# Patient Record
Sex: MALE | Race: BLACK OR AFRICAN AMERICAN | Employment: UNEMPLOYED | URBAN - METROPOLITAN AREA
[De-identification: names, ages, dates, MRNs, and addresses within clinical notes are randomized per-mention and may not be internally consistent; named-entity substitution may affect disease eponyms.]

---

## 2018-04-26 ENCOUNTER — OFFICE VISIT (OUTPATIENT)
Dept: NEUROLOGY | Age: 48
End: 2018-04-26

## 2018-04-26 VITALS
OXYGEN SATURATION: 97 % | HEIGHT: 70 IN | DIASTOLIC BLOOD PRESSURE: 80 MMHG | HEART RATE: 64 BPM | BODY MASS INDEX: 36.36 KG/M2 | SYSTOLIC BLOOD PRESSURE: 130 MMHG | RESPIRATION RATE: 14 BRPM | WEIGHT: 254 LBS

## 2018-04-26 DIAGNOSIS — G44.329 CHRONIC POST-TRAUMATIC HEADACHE, NOT INTRACTABLE: Primary | ICD-10-CM

## 2018-04-26 RX ORDER — CAPSAICIN 0.03 G/100G
CREAM TOPICAL 3 TIMES DAILY
COMMUNITY

## 2018-04-26 RX ORDER — DOCUSATE SODIUM 100 MG/1
100 CAPSULE, LIQUID FILLED ORAL 2 TIMES DAILY
COMMUNITY

## 2018-04-26 RX ORDER — TRAZODONE HYDROCHLORIDE 100 MG/1
100 TABLET ORAL
COMMUNITY
End: 2018-07-26 | Stop reason: ALTCHOICE

## 2018-04-26 RX ORDER — METFORMIN HYDROCHLORIDE 500 MG/1
TABLET ORAL 2 TIMES DAILY WITH MEALS
COMMUNITY

## 2018-04-26 RX ORDER — ACETAMINOPHEN 500 MG
TABLET ORAL
COMMUNITY

## 2018-04-26 RX ORDER — FACIAL-BODY WIPES
10 EACH TOPICAL DAILY
COMMUNITY

## 2018-04-26 RX ORDER — DICLOFENAC SODIUM 10 MG/G
GEL TOPICAL 4 TIMES DAILY
COMMUNITY

## 2018-04-26 RX ORDER — GABAPENTIN 300 MG/1
300 CAPSULE ORAL 2 TIMES DAILY
Qty: 60 CAP | Refills: 2 | Status: SHIPPED | OUTPATIENT
Start: 2018-04-26 | End: 2018-07-26 | Stop reason: ALTCHOICE

## 2018-04-26 RX ORDER — TOPIRAMATE 100 MG/1
100 TABLET, FILM COATED ORAL 2 TIMES DAILY
Qty: 60 TAB | Refills: 2 | Status: SHIPPED | OUTPATIENT
Start: 2018-04-26

## 2018-04-26 NOTE — PROGRESS NOTES
Patient is here for headaches  20+ years  Usually 6-8/10 pain daily headaches  TBI in [employer]
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
## 2018-04-26 NOTE — MR AVS SNAPSHOT
[facility] [address] 
[phone] Patient: Nelly Wagoner MRN: [id number] OLP:[birth year] Visit Information Date & Time Provider Department Dept. Phone Encounter #  
 4/26/2018  2:00 PM Vincent Sinclair MD [facility] at [facility] [id number] [phone] Follow-up Instructions Return in about 3 months (around 7/26/2018). Upcoming Health Maintenance Date Due Pneumococcal 19-64 Medium Risk (1 of 1 - PPSV23) 9/1/1988 DTaP/Tdap/Td series (1 - Tdap) 9/1/1990 Influenza Age 5 to Adult 8/1/2017 Allergies as of 4/26/2018  Review Complete On: 4/26/2018 By: Vincent Sinclair MD  
 No Known Allergies Current Immunizations  Never Reviewed No immunizations on file. Not reviewed this visit You Were Diagnosed With   
  
 Codes Comments Chronic post-traumatic headache, not intractable    -  Primary ICD-10-CM: D03.274 ICD-9-CM: 339.22 Vitals BP Pulse Resp Height(growth percentile) Weight(growth percentile) SpO2  
 130/80 64 14 5' 10\" (1.778 m) 254 lb (115.2 kg) 97% BMI Smoking Status 36.45 kg/m2 Current Every Day Smoker Vitals History BMI and BSA Data Body Mass Index Body Surface Area  
 36.45 kg/m 2 2.39 m 2 Your Updated Medication List  
  
   
This list is accurate as of 4/26/18  2:35 PM.  Always use your most recent med list.  
  
  
  
  
 atorvastatin 80 mg tablet Commonly known as:  LIPITOR Take 80 mg by mouth daily. bisacodyl 10 mg suppository Commonly known as:  DULCOLAX Insert 10 mg into rectum daily. capsicum oleoresin 0.025 % topical cream  
Apply  to affected area three (3) times daily. cholecalciferol 1,000 unit tablet Commonly known as:  VITAMIN D3 Take 2,000 Units by mouth daily. diclofenac 1 % Gel Commonly known as:  VOLTAREN  
 Apply  to affected area four (4) times daily. docusate sodium 100 mg capsule Commonly known as:  Brigido Stai Take 100 mg by mouth two (2) times a day.  
  
 gabapentin 300 mg capsule Commonly known as:  NEURONTIN Take 1 Cap by mouth two (2) times a day.  
  
 hydrOXYzine HCl 50 mg tablet Commonly known as:  ATARAX Take 50 mg by mouth nightly. metFORMIN 500 mg tablet Commonly known as:  GLUCOPHAGE Take  by mouth two (2) times daily (with meals). Omeprazole delayed release 20 mg tablet Commonly known as:  PRILOSEC D/R Take 40 mg by mouth two (2) times a day. sertraline 100 mg tablet Commonly known as:  ZOLOFT Take 150 mg by mouth daily. topiramate 100 mg tablet Commonly known as:  TOPAMAX Take 1 Tab by mouth two (2) times a day. traZODone 100 mg tablet Commonly known as:  Crockett Dart Take 100 mg by mouth nightly. TYLENOL EXTRA STRENGTH 500 mg tablet Generic drug:  acetaminophen Take  by mouth every six (6) hours as needed for Pain. Prescriptions Printed Refills  
 topiramate (TOPAMAX) 100 mg tablet 2 Sig: Take 1 Tab by mouth two (2) times a day. Class: Print Route: Oral  
 gabapentin (NEURONTIN) 300 mg capsule 2 Sig: Take 1 Cap by mouth two (2) times a day. Class: Print Route: Oral  
  
Follow-up Instructions Return in about 3 months (around 7/26/2018). Introducing [facility]! [facility] introduces Sampa patient portal. Now you can access parts of your medical record, email your doctor's office, and request medication refills online. 1. In your internet browser, go to [email or web address] 2. Click on the First Time User? Click Here link in the Sign In box. You will see the New Member Sign Up page. 3. Enter your Sampa Access Code exactly as it appears below. You will not need to use this code after youve completed the sign-up process.  If you do not sign up before the expiration date, you must request a new code. · [employer] Access Code: [email or web address] Expires: 7/25/2018  2:28 PM 
 
4. Enter the last four digits of your Social Security Number (xxxx) and Date of Birth (mm/dd/yyyy) as indicated and click Submit. You will be taken to the next sign-up page. 5. Create a [employer] ID. This will be your [employer] login ID and cannot be changed, so think of one that is secure and easy to remember. 6. Create a [employer] password. You can change your password at any time. 7. Enter your Password Reset Question and Answer. This can be used at a later time if you forget your password. 8. Enter your e-mail address. You will receive e-mail notification when new information is available in [address] 9. Click Sign Up. You can now view and download portions of your medical record. 10. Click the Download Summary menu link to download a portable copy of your medical information. If you have questions, please visit the Frequently Asked Questions section of the [employer] website. Remember, [employer] is NOT to be used for urgent needs. For medical emergencies, dial 911. Now available from your iPhone and Android! Please provide this summary of care documentation to your next provider. Your primary care clinician is listed as Varun Painter. If you have any questions after today's visit, please call [phone].

## 2018-05-14 ENCOUNTER — TELEPHONE (OUTPATIENT)
Dept: NEUROLOGY | Age: 48
End: 2018-05-14

## 2018-07-26 ENCOUNTER — OFFICE VISIT (OUTPATIENT)
Dept: NEUROLOGY | Age: 48
End: 2018-07-26

## 2018-07-26 VITALS
OXYGEN SATURATION: 97 % | WEIGHT: 261 LBS | HEIGHT: 70 IN | BODY MASS INDEX: 37.37 KG/M2 | SYSTOLIC BLOOD PRESSURE: 130 MMHG | HEART RATE: 77 BPM | DIASTOLIC BLOOD PRESSURE: 80 MMHG | RESPIRATION RATE: 16 BRPM

## 2018-07-26 DIAGNOSIS — F32.A MILD DEPRESSION: ICD-10-CM

## 2018-07-26 DIAGNOSIS — G44.329 CHRONIC POST-TRAUMATIC HEADACHE, NOT INTRACTABLE: Primary | ICD-10-CM

## 2018-07-26 DIAGNOSIS — G43.709 CHRONIC MIGRAINE WITHOUT AURA WITHOUT STATUS MIGRAINOSUS, NOT INTRACTABLE: ICD-10-CM

## 2018-07-26 RX ORDER — DULOXETIN HYDROCHLORIDE 30 MG/1
30 CAPSULE, DELAYED RELEASE ORAL DAILY
Qty: 30 CAP | Refills: 1 | Status: SHIPPED | OUTPATIENT
Start: 2018-07-26 | End: 2018-07-26 | Stop reason: SDUPTHER

## 2018-07-26 RX ORDER — DULOXETIN HYDROCHLORIDE 30 MG/1
30 CAPSULE, DELAYED RELEASE ORAL DAILY
Qty: 30 CAP | Refills: 1 | Status: SHIPPED | OUTPATIENT
Start: 2018-07-26 | End: 2018-08-09 | Stop reason: ALTCHOICE

## 2018-07-26 NOTE — MR AVS SNAPSHOT
[facility] [address] 
[phone] Patient: Kathy Clements MRN: [id number] MCB:[birth year] Visit Information Date & Time Provider Department Dept. Phone Encounter #  
 7/26/2018  1:40 PM Abeba Fu MD [facility] Neurology Clinic at [address] [phone] Follow-up Instructions Return in about 3 months (around 10/26/2018). Upcoming Health Maintenance Date Due Pneumococcal 19-64 Medium Risk (1 of 1 - PPSV23) 9/1/1988 DTaP/Tdap/Td series (1 - Tdap) 9/1/1990 Influenza Age 5 to Adult 8/1/2018 Allergies as of 7/26/2018  Review Complete On: 7/26/2018 By: Abeba Fu MD  
 No Known Allergies Current Immunizations  Never Reviewed No immunizations on file. Not reviewed this visit You Were Diagnosed With   
  
 Codes Comments Chronic post-traumatic headache, not intractable    -  Primary ICD-10-CM: X72.696 ICD-9-CM: 339.22 Chronic migraine without aura without status migrainosus, not intractable     ICD-10-CM: A75.061 ICD-9-CM: 346.70 Mild depression (HCC)     ICD-10-CM: F32.0 ICD-9-CM: 262 Vitals BP Pulse Resp Height(growth percentile) Weight(growth percentile) SpO2  
 130/80 77 16 5' 10\" (1.778 m) 261 lb (118.4 kg) 97% BMI Smoking Status 37.45 kg/m2 Current Every Day Smoker Vitals History BMI and BSA Data Body Mass Index Body Surface Area  
 37.45 kg/m 2 2.42 m 2 Preferred Pharmacy Pharmacy Name Phone [address] [facility] [phone] Your Updated Medication List  
  
   
This list is accurate as of 7/26/18  2:04 PM.  Always use your most recent med list.  
  
  
  
  
 atorvastatin 80 mg tablet Commonly known as:  LIPITOR Take 80 mg by mouth daily. bisacodyl 10 mg suppository Commonly known as:  DULCOLAX Insert 10 mg into rectum daily. capsicum oleoresin 0.025 % topical cream  
Apply  to affected area three (3) times daily. cholecalciferol 1,000 unit tablet Commonly known as:  VITAMIN D3 Take 2,000 Units by mouth daily. diclofenac 1 % Gel Commonly known as:  VOLTAREN Apply  to affected area four (4) times daily. docusate sodium 100 mg capsule Commonly known as:  Paxico Lass Take 100 mg by mouth two (2) times a day. DULoxetine 30 mg capsule Commonly known as:  CYMBALTA Take 1 Cap by mouth daily. Indications: ANXIETY WITH DEPRESSION  
  
 erenumab-aooe 70 mg/mL Atin Commonly known as:  AIMOVIG AUTOINJECTOR (2 PACK) 70 mg by SubCUTAneous route every thirty (30) days. hydrOXYzine HCl 50 mg tablet Commonly known as:  ATARAX Take 50 mg by mouth nightly. metFORMIN 500 mg tablet Commonly known as:  GLUCOPHAGE Take  by mouth two (2) times daily (with meals). Omeprazole delayed release 20 mg tablet Commonly known as:  PRILOSEC D/R Take 40 mg by mouth two (2) times a day. sertraline 100 mg tablet Commonly known as:  ZOLOFT Take 150 mg by mouth daily. topiramate 100 mg tablet Commonly known as:  TOPAMAX Take 1 Tab by mouth two (2) times a day. TYLENOL EXTRA STRENGTH 500 mg tablet Generic drug:  acetaminophen Take  by mouth every six (6) hours as needed for Pain. Prescriptions Printed Refills  
 erenumab-aooe (AIMOVIG AUTOINJECTOR, 2 PACK,) 70 mg/mL atIn 2 Si mg by SubCUTAneous route every thirty (30) days. Class: Print Route: SubCUTAneous DULoxetine (CYMBALTA) 30 mg capsule 1 Sig: Take 1 Cap by mouth daily. Indications: ANXIETY WITH DEPRESSION Class: Print Route: Oral  
  
Follow-up Instructions Return in about 3 months (around 10/26/2018). Patient Instructions A Healthy Lifestyle: Care Instructions Your Care Instructions A healthy lifestyle can help you feel good, stay at a healthy weight, and have plenty of energy for both work and play. A healthy lifestyle is something you can share with your whole family. A healthy lifestyle also can lower your risk for serious health problems, such as high blood pressure, heart disease, and diabetes. You can follow a few steps listed below to improve your health and the health of your family. Follow-up care is a key part of your treatment and safety. Be sure to make and go to all appointments, and call your doctor if you are having problems. It's also a good idea to know your test results and keep a list of the medicines you take. How can you care for yourself at home? · Do not eat too much sugar, fat, or fast foods. You can still have dessert and treats now and then. The goal is moderation. · Start small to improve your eating habits. Pay attention to portion sizes, drink less juice and soda pop, and eat more fruits and vegetables. ¨ Eat a healthy amount of food. A 3-ounce serving of meat, for example, is about the size of a deck of cards. Fill the rest of your plate with vegetables and whole grains. ¨ Limit the amount of soda and sports drinks you have every day. Drink more water when you are thirsty. ¨ Eat at least 5 servings of fruits and vegetables every day. It may seem like a lot, but it is not hard to reach this goal. A serving or helping is 1 piece of fruit, 1 cup of vegetables, or 2 cups of leafy, raw vegetables. Have an apple or some carrot sticks as an afternoon snack instead of a candy bar. Try to have fruits and/or vegetables at every meal. 
· Make exercise part of your daily routine. You may want to start with simple activities, such as walking, bicycling, or slow swimming. Try to be active 30 to 60 minutes every day. You do not need to do all 30 to 60 minutes all at once.  For example, you can exercise 3 times a day for 10 or 20 minutes. Moderate exercise is safe for most people, but it is always a good idea to talk to your doctor before starting an exercise program. 
· Keep moving. Ted Ramez the lawn, work in the garden, or [email or web address]. Take the stairs instead of the elevator at work. · If you smoke, quit. People who smoke have an increased risk for heart attack, stroke, cancer, and other lung illnesses. Quitting is hard, but there are ways to boost your chance of quitting tobacco for good. ¨ Use nicotine gum, patches, or lozenges. ¨ Ask your doctor about stop-smoking programs and medicines. ¨ Keep trying. In addition to reducing your risk of diseases in the future, you will notice some benefits soon after you stop using tobacco. If you have shortness of breath or asthma symptoms, they will likely get better within a few weeks after you quit. · Limit how much alcohol you drink. Moderate amounts of alcohol (up to 2 drinks a day for men, 1 drink a day for women) are okay. But drinking too much can lead to liver problems, high blood pressure, and other health problems. Family health If you have a family, there are many things you can do together to improve your health. · Eat meals together as a family as often as possible. · Eat healthy foods. This includes fruits, vegetables, lean meats and dairy, and whole grains. · Include your family in your fitness plan. Most people think of activities such as jogging or tennis as the way to fitness, but there are many ways you and your family can be more active. Anything that makes you breathe hard and gets your heart pumping is exercise. Here are some tips: 
¨ Walk to do errands or to take your child to school or the bus. ¨ Go for a family bike ride after dinner instead of watching TV. Where can you learn more? Go to http://bhumi-gonzalo.info/. Enter W180 in the search box to learn more about \"A Healthy Lifestyle: Care Instructions. \" Current as of: December 7, 2017 Content Version: 11.7 © 9513-4809 [employer]. Care instructions adapted under license by [employer] (which disclaims liability or warranty for this information). If you have questions about a medical condition or this instruction, always ask your healthcare professional. [address] any warranty or liability for your use of this information. Introducing [facility]! [employer] introduces Elo7 patient portal. Now you can access parts of your medical record, email your doctor's office, and request medication refills online. 1. In your internet browser, go to [email or web address] 2. Click on the First Time User? Click Here link in the Sign In box. You will see the New Member Sign Up page. 3. Enter your Elo7 Access Code exactly as it appears below. You will not need to use this code after youve completed the sign-up process. If you do not sign up before the expiration date, you must request a new code. · Elo7 Access Code: [email or web address] Expires: 10/24/2018  1:35 PM 
 
4. Enter the last four digits of your Social Security Number (xxxx) and Date of Birth (mm/dd/yyyy) as indicated and click Submit. You will be taken to the next sign-up page. 5. Create a Elo7 ID. This will be your Elo7 login ID and cannot be changed, so think of one that is secure and easy to remember. 6. Create a Elo7 password. You can change your password at any time. 7. Enter your Password Reset Question and Answer. This can be used at a later time if you forget your password. 8. Enter your e-mail address. You will receive e-mail notification when new information is available in [address] 9. Click Sign Up. You can now view and download portions of your medical record. 10. Click the Download Summary menu link to download a portable copy of your medical information. If you have questions, please visit the Frequently Asked Questions section of the [employer] | [employer] website. Remember, [employer] is NOT to be used for urgent needs. For medical emergencies, dial 911. Now available from your iPhone and Android! Please provide this summary of care documentation to your next provider. Your primary care clinician is listed as Judd Upton. If you have any questions after today's visit, please call [phone].
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
## 2018-07-26 NOTE — PATIENT INSTRUCTIONS

## 2018-08-08 ENCOUNTER — TELEPHONE (OUTPATIENT)
Dept: NEUROLOGY | Age: 48
End: 2018-08-08

## 2018-08-08 NOTE — TELEPHONE ENCOUNTER
Pt called to let you know the \"shot\" he got at his last visit didn't help at all for his headache. Also, he wants you to know the duloxetine and Sertraline make him so very sleepy. Is there something else he can take?

## 2018-08-08 NOTE — TELEPHONE ENCOUNTER
Pt stated he was supposed to call back and speak with the nurse re medication. Please call back after 12.

## 2018-08-09 RX ORDER — PROTRIPTYLINE HYDROCHLORIDE 5 MG/1
5 TABLET, FILM COATED ORAL
Qty: 30 TAB | Refills: 1 | Status: SHIPPED | OUTPATIENT
Start: 2018-08-09

## 2018-08-09 NOTE — TELEPHONE ENCOUNTER
I believe he is referring to Aimovig monthly injection for prophylaxis. Ramos Settles should try at least 1 more injection next month. He may stop Cymbalta and try protriptyline 5 mg instead which is less sedating or can be alerting. Take 5 mg for 1week and increase to 10 mg if needed. (Routing comment)     Do you want him to stay on the Sertraline?

## 2018-08-10 NOTE — TELEPHONE ENCOUNTER
Yes (Routing comment)     I called pt and left detailed msg on his cell#. Also asked that he call us back so I know where to send new script. Looks like he uses [facility] and I don't see a fax # for them. Does he wasn't to p/u or have mailed to him?

## 2018-08-13 ENCOUNTER — TELEPHONE (OUTPATIENT)
Dept: NEUROLOGY | Age: 48
End: 2018-08-13

## 2018-08-13 NOTE — TELEPHONE ENCOUNTER
----- Message from Ivania Alicea sent at 8/13/2018 12:32 PM EDT -----  Regarding: Dr. Collins Levin  Pt advised Cele Kilgore that document regarding Rx (not disclosed) should be sent to [facility] attn: Dr. Jaren Gonzalez f) [phone] (please include medical notes for why medication is needed). Pt best contact [phone].

## 2018-10-30 ENCOUNTER — OFFICE VISIT (OUTPATIENT)
Dept: NEUROLOGY | Age: 48
End: 2018-10-30

## 2018-10-30 VITALS
HEIGHT: 70 IN | SYSTOLIC BLOOD PRESSURE: 140 MMHG | OXYGEN SATURATION: 97 % | RESPIRATION RATE: 16 BRPM | HEART RATE: 72 BPM | DIASTOLIC BLOOD PRESSURE: 80 MMHG | WEIGHT: 262 LBS | BODY MASS INDEX: 37.51 KG/M2

## 2018-10-30 DIAGNOSIS — G43.709 CHRONIC MIGRAINE WITHOUT AURA WITHOUT STATUS MIGRAINOSUS, NOT INTRACTABLE: ICD-10-CM

## 2018-10-30 DIAGNOSIS — G44.329 CHRONIC POST-TRAUMATIC HEADACHE, NOT INTRACTABLE: Primary | ICD-10-CM

## 2018-10-30 RX ORDER — TRAZODONE HYDROCHLORIDE 100 MG/1
100 TABLET ORAL
COMMUNITY

## 2018-10-30 RX ORDER — TROSPIUM CHLORIDE 20 MG/1
20 TABLET, FILM COATED ORAL
COMMUNITY

## 2018-10-30 RX ORDER — TAMSULOSIN HYDROCHLORIDE 0.4 MG/1
0.4 CAPSULE ORAL DAILY
COMMUNITY

## 2018-10-30 RX ORDER — PRAZOSIN HYDROCHLORIDE 2 MG/1
2 CAPSULE ORAL
COMMUNITY

## 2018-10-30 RX ORDER — BISMUTH SUBSALICYLATE 262 MG
1 TABLET,CHEWABLE ORAL DAILY
COMMUNITY
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
## 2024-03-16 ENCOUNTER — HOSPITAL ENCOUNTER (EMERGENCY)
Facility: HOSPITAL | Age: 54
Discharge: HOME | End: 2024-03-16
Attending: EMERGENCY MEDICINE

## 2024-03-16 VITALS
OXYGEN SATURATION: 95 % | DIASTOLIC BLOOD PRESSURE: 97 MMHG | SYSTOLIC BLOOD PRESSURE: 150 MMHG | RESPIRATION RATE: 15 BRPM | TEMPERATURE: 97.8 F | HEART RATE: 78 BPM

## 2024-03-16 DIAGNOSIS — M79.605 LEFT LEG PAIN: Primary | ICD-10-CM

## 2024-03-16 PROCEDURE — 99282 EMERGENCY DEPT VISIT SF MDM: CPT

## 2024-03-16 PROCEDURE — 99281 EMR DPT VST MAYX REQ PHY/QHP: CPT

## 2024-03-16 ASSESSMENT — PAIN - FUNCTIONAL ASSESSMENT
PAIN_FUNCTIONAL_ASSESSMENT: PREVENTS OR INTERFERES SOME ACTIVE ACTIVITIES AND ADLS
PAIN_FUNCTIONAL_ASSESSMENT: 0-10

## 2024-03-16 ASSESSMENT — PAIN DESCRIPTION - DESCRIPTORS: DESCRIPTORS: CRAMPING

## 2024-03-16 ASSESSMENT — PAIN DESCRIPTION - PAIN TYPE: TYPE: ACUTE PAIN

## 2024-03-16 ASSESSMENT — PAIN DESCRIPTION - ONSET: ONSET: SUDDEN

## 2024-03-16 ASSESSMENT — PAIN SCALES - GENERAL: PAINLEVEL_OUTOF10: 6

## 2024-03-16 ASSESSMENT — PAIN DESCRIPTION - FREQUENCY: FREQUENCY: INTERMITTENT

## 2024-03-16 ASSESSMENT — PAIN DESCRIPTION - ORIENTATION: ORIENTATION: LEFT;LOWER

## 2024-03-16 ASSESSMENT — PAIN DESCRIPTION - LOCATION: LOCATION: LEG

## 2024-03-16 NOTE — DISCHARGE INSTRUCTIONS
You did not want to stay for further testing in the ED.  Please follow-up with your PCP or return for worsening symptoms.  Thank you.

## 2024-03-16 NOTE — ED TRIAGE NOTES
Patient arrives with a CC of L leg pain and swelling. Patient stated he woke up to cramping and swelling in his L leg. Has had knee pain for 4-5 days.     Stated he took tylenol 20-30 minute ago.

## 2024-03-17 NOTE — ED PROVIDER NOTES
[facility] EMERGENCY DEP  EMERGENCY DEPARTMENT ENCOUNTER      Pt Name: Сергей Mason  MRN: [id number]  Birthdate 1970  Date of evaluation: 3/16/2024  Provider: Jocelyne Babin MD    CHIEF COMPLAINT       Chief Complaint   Patient presents with    Leg Pain         HISTORY OF PRESENT ILLNESS   (Location/Symptom, Timing/Onset, Context/Setting, Quality, Duration, Modifying Factors, Severity)  Note limiting factors.   Patient presents with left leg pain and cramping that woke him up from sleep.  Denies any weakness, numbness.  Ambulating with steady gait.  At time of my evaluation, patient reported he did not want to be evaluated further and wanted to be discharged home.  Did not have any other complaints at this time.          Nursing Notes were reviewed.    REVIEW OF SYSTEMS    Not Required     Review of Systems    PAST MEDICAL HISTORY   No past medical history on file.    SURGICAL HISTORY     No past surgical history on file.    CURRENT MEDICATIONS     There are no discharge medications for this patient.      ALLERGIES     Patient has no known allergies.    FAMILY HISTORY     No family history on file.     SOCIAL HISTORY       Social History     Socioeconomic History    Marital status: Single       PHYSICAL EXAM     Vitals:    Vitals:    03/16/24 0400   BP: (!) 150/97   Pulse: 78   Resp: 15   Temp: 97.8 °F (36.6 °C)   TempSrc: Oral   SpO2: 95%       Physical Exam  Constitutional:       Appearance: Normal appearance. He is not toxic-appearing.   HENT:      Head: Normocephalic.      Right Ear: External ear normal.      Left Ear: External ear normal.      Nose: Nose normal.      Mouth/Throat:      Mouth: Mucous membranes are moist.   Eyes:      General: No scleral icterus.  Cardiovascular:      Rate and Rhythm: Normal rate.   Pulmonary:      Effort: Pulmonary effort is normal. No respiratory distress.      Breath sounds: No stridor.   Abdominal:      General: There is no distension.   Musculoskeletal: